# Patient Record
(demographics unavailable — no encounter records)

---

## 2020-06-23 NOTE — TELEPHONE ENCOUNTER
Pt states he left a message yesterday in office about refill. He was a Dr. Moshe Yang pt and he is out of refills on a medication. He has not established with a new physician and is now almost out of meds. (Stated he could come in today as a new pt with someone.) He doesn't know what he should do, please contact pt with further instructions on his cell phone.